# Patient Record
Sex: FEMALE | Race: WHITE | Employment: OTHER
[De-identification: names, ages, dates, MRNs, and addresses within clinical notes are randomized per-mention and may not be internally consistent; named-entity substitution may affect disease eponyms.]

---

## 2017-05-16 ENCOUNTER — HOSPITAL ENCOUNTER (OUTPATIENT)
Dept: PHYSICAL THERAPY | Facility: CLINIC | Age: 77
Setting detail: THERAPIES SERIES
Discharge: HOME | End: 2017-05-16
Payer: MEDICARE

## 2017-05-16 PROCEDURE — G0283 ELEC STIM OTHER THAN WOUND: HCPCS

## 2017-05-16 PROCEDURE — 97162 PT EVAL MOD COMPLEX 30 MIN: CPT

## 2017-05-16 PROCEDURE — G8979 MOBILITY GOAL STATUS: HCPCS

## 2017-05-16 PROCEDURE — G8978 MOBILITY CURRENT STATUS: HCPCS

## 2017-05-16 PROCEDURE — 97110 THERAPEUTIC EXERCISES: CPT

## 2017-05-23 ENCOUNTER — HOSPITAL ENCOUNTER (OUTPATIENT)
Dept: PHYSICAL THERAPY | Facility: CLINIC | Age: 77
Setting detail: THERAPIES SERIES
Discharge: HOME | End: 2017-05-23
Payer: MEDICARE

## 2017-05-23 PROCEDURE — 97110 THERAPEUTIC EXERCISES: CPT

## 2017-05-23 PROCEDURE — 97035 APP MDLTY 1+ULTRASOUND EA 15: CPT

## 2017-05-23 PROCEDURE — G0283 ELEC STIM OTHER THAN WOUND: HCPCS

## 2017-05-26 ENCOUNTER — HOSPITAL ENCOUNTER (OUTPATIENT)
Dept: PHYSICAL THERAPY | Facility: CLINIC | Age: 77
Setting detail: THERAPIES SERIES
Discharge: HOME | End: 2017-05-26
Payer: MEDICARE

## 2017-05-30 ENCOUNTER — HOSPITAL ENCOUNTER (OUTPATIENT)
Dept: PHYSICAL THERAPY | Facility: CLINIC | Age: 77
Setting detail: THERAPIES SERIES
Discharge: HOME | End: 2017-05-30
Payer: MEDICARE

## 2017-06-02 ENCOUNTER — HOSPITAL ENCOUNTER (OUTPATIENT)
Dept: PHYSICAL THERAPY | Facility: CLINIC | Age: 77
Setting detail: THERAPIES SERIES
Discharge: HOME | End: 2017-06-02
Payer: MEDICARE

## 2017-06-02 PROCEDURE — 97110 THERAPEUTIC EXERCISES: CPT

## 2017-06-02 PROCEDURE — 97035 APP MDLTY 1+ULTRASOUND EA 15: CPT

## 2017-06-02 PROCEDURE — G8979 MOBILITY GOAL STATUS: HCPCS

## 2017-06-02 PROCEDURE — G0283 ELEC STIM OTHER THAN WOUND: HCPCS

## 2017-06-02 PROCEDURE — G8980 MOBILITY D/C STATUS: HCPCS

## 2017-06-07 ENCOUNTER — APPOINTMENT (OUTPATIENT)
Dept: PHYSICAL THERAPY | Facility: CLINIC | Age: 77
End: 2017-06-07
Payer: MEDICARE

## 2017-06-26 ENCOUNTER — HOSPITAL ENCOUNTER (OUTPATIENT)
Dept: PHYSICAL THERAPY | Facility: CLINIC | Age: 77
Setting detail: THERAPIES SERIES
Discharge: HOME | End: 2017-06-26
Payer: MEDICARE

## 2017-06-26 PROCEDURE — 97110 THERAPEUTIC EXERCISES: CPT

## 2017-06-26 PROCEDURE — G8979 MOBILITY GOAL STATUS: HCPCS

## 2017-06-26 PROCEDURE — 97162 PT EVAL MOD COMPLEX 30 MIN: CPT

## 2017-06-26 PROCEDURE — G8978 MOBILITY CURRENT STATUS: HCPCS

## 2017-06-26 PROCEDURE — 97016 VASOPNEUMATIC DEVICE THERAPY: CPT

## 2017-06-28 ENCOUNTER — HOSPITAL ENCOUNTER (OUTPATIENT)
Dept: PHYSICAL THERAPY | Facility: CLINIC | Age: 77
Setting detail: THERAPIES SERIES
Discharge: HOME | End: 2017-06-28
Payer: MEDICARE

## 2017-06-28 PROCEDURE — 97016 VASOPNEUMATIC DEVICE THERAPY: CPT

## 2017-06-28 PROCEDURE — 97110 THERAPEUTIC EXERCISES: CPT

## 2017-06-30 ENCOUNTER — APPOINTMENT (OUTPATIENT)
Dept: PHYSICAL THERAPY | Facility: CLINIC | Age: 77
End: 2017-06-30
Payer: MEDICARE

## 2017-06-30 ENCOUNTER — HOSPITAL ENCOUNTER (OUTPATIENT)
Dept: PHYSICAL THERAPY | Facility: CLINIC | Age: 77
Setting detail: THERAPIES SERIES
Discharge: HOME | End: 2017-06-30
Payer: MEDICARE

## 2017-06-30 PROCEDURE — 97016 VASOPNEUMATIC DEVICE THERAPY: CPT

## 2017-06-30 PROCEDURE — 97110 THERAPEUTIC EXERCISES: CPT

## 2017-07-03 ENCOUNTER — HOSPITAL ENCOUNTER (OUTPATIENT)
Dept: PHYSICAL THERAPY | Facility: CLINIC | Age: 77
Setting detail: THERAPIES SERIES
Discharge: HOME | End: 2017-07-03
Payer: MEDICARE

## 2017-07-03 PROCEDURE — 97016 VASOPNEUMATIC DEVICE THERAPY: CPT

## 2017-07-03 PROCEDURE — 97110 THERAPEUTIC EXERCISES: CPT

## 2017-07-05 ENCOUNTER — HOSPITAL ENCOUNTER (OUTPATIENT)
Dept: PHYSICAL THERAPY | Facility: CLINIC | Age: 77
Setting detail: THERAPIES SERIES
Discharge: HOME | End: 2017-07-05
Payer: MEDICARE

## 2017-07-05 PROCEDURE — 97124 MASSAGE THERAPY: CPT

## 2017-07-05 PROCEDURE — 97110 THERAPEUTIC EXERCISES: CPT

## 2017-07-05 PROCEDURE — 97016 VASOPNEUMATIC DEVICE THERAPY: CPT

## 2017-07-07 ENCOUNTER — HOSPITAL ENCOUNTER (OUTPATIENT)
Dept: PHYSICAL THERAPY | Facility: CLINIC | Age: 77
Setting detail: THERAPIES SERIES
Discharge: HOME | End: 2017-07-07
Payer: MEDICARE

## 2017-07-07 PROCEDURE — 97016 VASOPNEUMATIC DEVICE THERAPY: CPT

## 2017-07-07 PROCEDURE — 97124 MASSAGE THERAPY: CPT

## 2017-07-07 PROCEDURE — 97110 THERAPEUTIC EXERCISES: CPT

## 2017-07-07 PROCEDURE — 97116 GAIT TRAINING THERAPY: CPT

## 2017-07-12 ENCOUNTER — HOSPITAL ENCOUNTER (OUTPATIENT)
Dept: PHYSICAL THERAPY | Facility: CLINIC | Age: 77
Setting detail: THERAPIES SERIES
Discharge: HOME | End: 2017-07-12
Payer: MEDICARE

## 2017-07-12 PROCEDURE — 97110 THERAPEUTIC EXERCISES: CPT

## 2017-07-12 PROCEDURE — 97116 GAIT TRAINING THERAPY: CPT

## 2017-07-12 PROCEDURE — 97124 MASSAGE THERAPY: CPT

## 2017-07-12 PROCEDURE — 97016 VASOPNEUMATIC DEVICE THERAPY: CPT

## 2017-07-14 ENCOUNTER — HOSPITAL ENCOUNTER (OUTPATIENT)
Dept: PHYSICAL THERAPY | Facility: CLINIC | Age: 77
Setting detail: THERAPIES SERIES
Discharge: HOME | End: 2017-07-14
Payer: MEDICARE

## 2017-07-14 PROCEDURE — 97016 VASOPNEUMATIC DEVICE THERAPY: CPT

## 2017-07-14 PROCEDURE — 97124 MASSAGE THERAPY: CPT

## 2017-07-14 PROCEDURE — 97116 GAIT TRAINING THERAPY: CPT

## 2017-07-14 PROCEDURE — 97110 THERAPEUTIC EXERCISES: CPT

## 2017-07-17 ENCOUNTER — HOSPITAL ENCOUNTER (OUTPATIENT)
Dept: PHYSICAL THERAPY | Facility: CLINIC | Age: 77
Setting detail: THERAPIES SERIES
Discharge: HOME | End: 2017-07-17
Payer: MEDICARE

## 2017-07-17 PROCEDURE — G8978 MOBILITY CURRENT STATUS: HCPCS

## 2017-07-17 PROCEDURE — G8979 MOBILITY GOAL STATUS: HCPCS

## 2017-07-17 PROCEDURE — 97116 GAIT TRAINING THERAPY: CPT

## 2017-07-17 PROCEDURE — 97016 VASOPNEUMATIC DEVICE THERAPY: CPT

## 2017-07-17 PROCEDURE — 97110 THERAPEUTIC EXERCISES: CPT

## 2017-07-19 ENCOUNTER — HOSPITAL ENCOUNTER (OUTPATIENT)
Dept: PHYSICAL THERAPY | Facility: CLINIC | Age: 77
Setting detail: THERAPIES SERIES
Discharge: HOME | End: 2017-07-19
Payer: MEDICARE

## 2017-07-19 PROCEDURE — 97116 GAIT TRAINING THERAPY: CPT

## 2017-07-19 PROCEDURE — 97016 VASOPNEUMATIC DEVICE THERAPY: CPT

## 2017-07-19 PROCEDURE — 97124 MASSAGE THERAPY: CPT

## 2017-07-19 PROCEDURE — 97110 THERAPEUTIC EXERCISES: CPT

## 2017-07-24 ENCOUNTER — HOSPITAL ENCOUNTER (OUTPATIENT)
Dept: PHYSICAL THERAPY | Facility: CLINIC | Age: 77
Setting detail: THERAPIES SERIES
Discharge: HOME | End: 2017-07-24
Payer: MEDICARE

## 2017-07-24 PROCEDURE — 97110 THERAPEUTIC EXERCISES: CPT

## 2017-07-24 PROCEDURE — 97016 VASOPNEUMATIC DEVICE THERAPY: CPT

## 2017-07-26 ENCOUNTER — HOSPITAL ENCOUNTER (OUTPATIENT)
Dept: PHYSICAL THERAPY | Facility: CLINIC | Age: 77
Setting detail: THERAPIES SERIES
Discharge: HOME | End: 2017-07-26
Payer: MEDICARE

## 2017-07-26 PROCEDURE — 97110 THERAPEUTIC EXERCISES: CPT

## 2017-07-26 PROCEDURE — 97016 VASOPNEUMATIC DEVICE THERAPY: CPT

## 2017-07-26 PROCEDURE — 97124 MASSAGE THERAPY: CPT

## 2017-07-28 ENCOUNTER — HOSPITAL ENCOUNTER (OUTPATIENT)
Dept: PHYSICAL THERAPY | Facility: CLINIC | Age: 77
Setting detail: THERAPIES SERIES
Discharge: HOME | End: 2017-07-28
Payer: MEDICARE

## 2017-07-28 PROCEDURE — 97016 VASOPNEUMATIC DEVICE THERAPY: CPT

## 2017-07-28 PROCEDURE — 97124 MASSAGE THERAPY: CPT

## 2017-07-28 PROCEDURE — 97110 THERAPEUTIC EXERCISES: CPT

## 2017-07-31 ENCOUNTER — HOSPITAL ENCOUNTER (OUTPATIENT)
Dept: PHYSICAL THERAPY | Facility: CLINIC | Age: 77
Setting detail: THERAPIES SERIES
Discharge: HOME | End: 2017-07-31
Payer: MEDICARE

## 2017-07-31 PROCEDURE — 97124 MASSAGE THERAPY: CPT

## 2017-07-31 PROCEDURE — 97016 VASOPNEUMATIC DEVICE THERAPY: CPT

## 2017-07-31 PROCEDURE — 97110 THERAPEUTIC EXERCISES: CPT

## 2017-08-02 ENCOUNTER — APPOINTMENT (OUTPATIENT)
Dept: PHYSICAL THERAPY | Facility: CLINIC | Age: 77
End: 2017-08-02
Payer: MEDICARE

## 2017-08-04 ENCOUNTER — HOSPITAL ENCOUNTER (OUTPATIENT)
Dept: PHYSICAL THERAPY | Facility: CLINIC | Age: 77
Setting detail: THERAPIES SERIES
Discharge: HOME | End: 2017-08-04
Payer: MEDICARE

## 2017-08-04 PROCEDURE — 97016 VASOPNEUMATIC DEVICE THERAPY: CPT

## 2017-08-04 PROCEDURE — 97110 THERAPEUTIC EXERCISES: CPT

## 2017-08-07 ENCOUNTER — HOSPITAL ENCOUNTER (OUTPATIENT)
Dept: PHYSICAL THERAPY | Facility: CLINIC | Age: 77
Setting detail: THERAPIES SERIES
Discharge: HOME | End: 2017-08-07
Payer: MEDICARE

## 2017-08-07 PROCEDURE — 97110 THERAPEUTIC EXERCISES: CPT

## 2017-08-07 PROCEDURE — 97016 VASOPNEUMATIC DEVICE THERAPY: CPT

## 2020-09-22 ENCOUNTER — HOSPITAL ENCOUNTER (OUTPATIENT)
Dept: PHYSICAL THERAPY | Facility: CLINIC | Age: 80
Setting detail: THERAPIES SERIES
Discharge: HOME | End: 2020-09-22
Payer: MEDICARE

## 2020-09-22 PROCEDURE — 97110 THERAPEUTIC EXERCISES: CPT

## 2020-09-22 PROCEDURE — 97124 MASSAGE THERAPY: CPT

## 2020-09-22 PROCEDURE — 97161 PT EVAL LOW COMPLEX 20 MIN: CPT

## 2020-09-22 NOTE — CONSULTS
[] [address]  [address]  [phone]  F: [phone] [] [address] [facility] 36   [address]  P: [phone]  F: [phone] [x] [address]  [address] [facility]  P: [phone]  F: [phone] [] [address]  [facility]   [address]   [place]: [phone]  F: [phone]      Physical Therapy General Evaluation    Date:  2020  Patient: Nalini Edwards  :   MRN: [id number]  Physician: Humphrey Hodgson      Insurance:Medicare   Medical Diagnosis: R TKA revision     Rehab Codes: Onset Date:                                   Next 's appt:     Subjective:   CC: Pain in knee, stiffness, fatigue  HPI: (onset date) Surgery @ [facility] for revision R TKA. Pt did her own HEP since 3rd TKA. PMHx: [] Unremarkable [] Diabetes [] HTN  [] Pacemaker   [] MI/Heart Problems [] Cancer [] Arthritis [x] Other:B TSA, back problems, L TKA                 [x] Refer to full medical chart  In EPIC     Comorbidities:   [] Obesity [] Dialysis  [] Other:   [] Asthma/COPD [] Dementia [] Other:   [] Stroke [] Sleep apnea [] Other:   [] Vascular disease [] Rheumatic disease [] Other:     Tests: [x] X-Ray: [] MRI:  [] Other:    Medications: [x] Refer to full medical record [] None [x] Other Aleve last 2 weeks :   Allergies:      [x] Refer to full medical record [] None [] Other:    Function:  Hand Dominance  [] Right  [x] Left  Patient lives with:     In what type of home [x]  One story   [] Two story   [] Split level   Number of stairs to enter    With handrail on the []  Right to enter   [] Left to enter   Bathroom has a []  Tub only  [] Tub/shower combo   [x] Walk in shower    []  Grab bars   Washing machine is on [x]  Main level   [] Second level   [] Basement   Employer    Job Status []  Normal duty   [] Light duty   [] Off due to condition    [x]  Retired   [] Not employed   [] Disability  [] Other:  []  Return to work:    Work activities/duties        ADL/IADL Previous level of function Current level of function Who currently assists the patient with task   Bathing  [] Independent  [] Assist [x] Independent  [] Assist    Dress/grooming [] Independent  [] Assist [x] Independent  [] Assist    Transfer/mobility [] Independent  [] Assist [x] Independent  [] Assist    Feeding [] Independent  [] Assist [x] Independent  [] Assist    Toileting [] Independent  [] Assist [x] Independent  [] Assist    Driving [] Independent  [] Assist [x] Independent  [] Assist    Housekeeping [] Independent  [] Assist [] Independent  [x] Assist anything low can not     Grocery shop/meal prep [] Independent  [] Assist [] Independent  [x] Assist w/ , smaller grocery stores        Gait Prior level of function Current level of function    [] Independent  [] Assist [x] Independent  [] Assist   Device: [] Independent [x] Independent    [] Straight Cane [] Quad cane [] Straight Cane [] Quad cane    [] Standard walker [] Rolling walker   [] 4 wheeled walker [] Standard walker [] Rolling walker   [] 4 wheeled walker    [] Wheelchair [] Wheelchair       Pt feels does better w/o cane   Pain:  [x] Yes  [] No Location: R knee  Pain Rating: (0-10 scale)3-4  /10  Pain altered Tx:  [] Yes  [] No  Action:    Symptoms:  [x] Improving slightly  [] Worsening [] Same  Better:  [x] AM    [] PM    [x] Sit    [] Rise/Sit    []Stand    [] Walk    [] Lying    [] Other:  Worse: [] AM    [x] PM    [] Sit    [] Rise/Sit    [x]Stand  Stiff & ache   [] Walk    [] Lying    [x] Bend                      [] Valsalva    [] Other:  Sleep: [x] OK    [] Disturbed    Objective: good patella mobility, moderate quad contraction      ROM  ° A/P STRENGTH  ROM    Left Right Left Right Cervical    Shoulder Flex Flexion    Abd     Extension    Elbow Flex     Rotation L R   Ext     Sidebend L R   Wrist Flex     Retraction    Ext     Lumbar    Hand      Flexion    Hip Flex  110   Extension    Ext     Rotation L  R   Abd  25   Sidebend L R   Knee Flex  112  4-      Ext  0  4      Hamstring  65        Ankle DF  15         PF  wnl           OBSERVATION No Deficit Deficit Not Tested Comments   Posture       Forward Head [] [] []    Rounded Shoulders [] [x] []    Kyphosis [] [] []    Lordosis [] [] []    Lateral Shift [] [] []    Scoliosis [] [] []    Iliac Crest [] [] []    PSIS [] [] []    ASIS [] [] []    Genu Valgus [] [x] []    Genu Varus [] [] []    Genu Recurvatum [] [] []    Pronation [] [x] []    Supination [] [] []    Leg Length Discrp [] [] []    Slumped Sitting [] [] []    Palpation [] [x] [] edl t e ad laeral to err patela. Sensation [] [x] [] Numbness B lower  LE    Edema [x] [] [] Mild peripatella in ankles PM    Neurological [x] [] []        Gait: pt walks w/ R LE in valgus but much better since R knee revison. Walked 200 feet a little tired not much  Functional Test: LE  32/80  40%  Score: 60 % functionally impaired     Comments:    Assessment:  Patient would benefit from skilled physical therapy services in order to:     Problems:    [x] ? Pain:  [x] ? ROM:  [x] ? Strength:  [x] ? Function:  [] Other:      STG: (to be met in 6 treatments)  1. ? Pain: < 3 w/ ADL @ ed  Day   2. ? ROM: 115  3. ? Strength:4+/5 Q/H   4. ? Function: +8 40/80 50% mpared    5. Patient to be independent with home exercise program as demonstrated by performance with correct form without cues. 6. Demonstrate Knowledge of fall prevention  7. Less c/o fatigue   LTG: (to be met in 12  treatments)  1. R 120 @ knee  2. 5-/5 q/h strength   3.  Function +15      Patient goals: less and less fatigue and  strength    Rehab Potential:  [x] Good  [] Fair  [] Poor   Suggested Professional Referral:  [x] No  [] Yes:  Barriers to Goal Achievement:  [x] No  [] Yes:  Domestic Concerns:  [x] No  [] Yes:    Pt. Education:  [x] Plans/Goals, Risks/Benefits discussed  [] Home exercise program  Method of Education: [x] Verbal  [x] Demo  [] Written  Comprehension of Education:  [x] Verbalizes understanding. [] Demonstrates understanding. [] Needs Review. [] Demonstrates/verbalizes understanding of HEP/Ed previously given. Treatment Plan:  [x] Therapeutic Exercise   11411  [] Iontophoresis: 4 mg/mL Dexamethasone Sodium Phosphate  mAmin  13431   [] Therapeutic Activity  10588 [x] Vasopneumatic cold with compression  43922    [x] Gait Training   26235 [x] Ultrasound   54104   [x] Neuromuscular Re-education  53279 [x] Electrical Stimulation Unattended  86101   [x] Manual Therapy  73545 [] Electrical Stimulation Attended  26056   [x] Instruction in HEP  [] Lumbar/Cervical Traction  85088   [x] Aquatic Therapy   96646 [x] Cold/hotpack    [] Massage   44602      [] Dry Needling, 1 or 2 muscles  78107   [] Biofeedback, first 15 minutes   05194  [] Biofeedback, additional 15 minutes   17058 [] Dry Needling, 3 or more muscles  77843     []  Medication allergies reviewed for use of  Dexamethasone Sodium Phosphate 4mg/ml     with iontophoresis treatments. Pt is not allergic.     Frequency:  2 x/week for  10visits    Todays Treatment:  Modalities:massage to R knee  Precautions: No weight machines    Exercises:  Exercise Reps/ Time Weight/ Level Comments   Nu step  10  L2    // bars         Retro walking  3       lateral walk 3       Hip abd         Hip flexion         Knee flexion         Heel raises                mat        hamstring stretch w/  rope    Easy     SAQ  15 3#    Other:    Specific Instructions for next treatment::advance program based  Orthopedic visit on 9/25    Evaluation Complexity:  History (Personal factors, comorbidities) [] 0 [x] 1-2 [] 3+   Exam (limitations, restrictions) [x] 1-2 [] 3 [] 4+   Clinical presentation (progression) [x]

## 2020-09-28 ENCOUNTER — HOSPITAL ENCOUNTER (OUTPATIENT)
Dept: PHYSICAL THERAPY | Facility: CLINIC | Age: 80
Setting detail: THERAPIES SERIES
Discharge: HOME | End: 2020-09-28
Payer: MEDICARE

## 2020-09-28 PROCEDURE — 97110 THERAPEUTIC EXERCISES: CPT

## 2020-09-28 PROCEDURE — 97124 MASSAGE THERAPY: CPT

## 2020-09-28 NOTE — FLOWSHEET NOTE
[] [facility]) - [facility]  [facility]  [address]  [phone]  F: [phone] [] [address]  [place] [address]   [address]  P: [phone]  F: [phone] [x] [address] &  Therapy  [address] [facility]  P: [phone]  F: [phone] [] [address]  [facility]   [address]   [place]: [phone]  F: [phone]      Physical Therapy Daily Treatment Note    Date:  2020  Patient Name:  Truman Denver    :  1940  MRN: [id number]  PPhysician: Tashia Rush                              Insurance:Medicare   Medical Diagnosis: R TKA revision                Rehab Codes: M25.561  Onset Date:                                   Next 's appt: None scheduled    Visit# / total visits:2/; Progress note for Medicare patient due at visit   Cancels/No Shows:     Subjective:    Pain:  [x] Yes  [] No Location:  R  Pain Rating: (0-10 scale) 2/10  Pain altered Tx:  [] No  [x] Yes  Action:careful w/ L hip   Comments: Pt has left hip pain w/ wt bearing after climbing a step on 20  . Objective:  Pt had appt w/ sugeon 20. DR. Vanessa Garza pleased w/ her progress, no furthe appt scheduled   Quad cotraction: fair contraction    Modalities: massage to R knee  Precautions:No weight machines per Dr Vanessa Garza   Exercises:  Exercise Reps/ Time Weight/ Level Comments   Nu step  10  L2     // bars            Retro walking  3  1 1/2       lateral walk 3  1 /2       Hip abd   15  1 1/2       Hip flexion   15  \"       Knee flexion   15  \"       Heel raises   15  \"       calf stretch           mat           hamstring stretch w/  rope      Easy           SAQ  15 3#           Sitting         LAQ  15 11/2       Hip add   10  Ball small      Other:    Treatment Charges: Mins Units   []  Modalities     [x]  Ther

## 2020-10-05 ENCOUNTER — HOSPITAL ENCOUNTER (OUTPATIENT)
Dept: PHYSICAL THERAPY | Facility: CLINIC | Age: 80
Setting detail: THERAPIES SERIES
Discharge: HOME | End: 2020-10-05
Payer: MEDICARE

## 2020-10-05 PROCEDURE — 97032 APPL MODALITY 1+ESTIM EA 15: CPT

## 2020-10-05 PROCEDURE — 97110 THERAPEUTIC EXERCISES: CPT

## 2020-10-05 NOTE — FLOWSHEET NOTE
[] [facility]) - [facility]  [facility]  [address]  [phone]  F: [phone] [] [address]  [place] [address]   [address]  P: [phone]  F: [phone] [x] [address] &  Therapy  [address] [facility]  P: [phone]  F: [phone] [] [address]  [facility]   [address]   [place]: [phone]  F: [phone]      Physical Therapy Daily Treatment Note    Date:  10/5/2020  Patient Name:  Penny Jaramillo    :  1940  MRN: [id number]  PPhysician: Higinio Lea                              Insurance:Medicare   Medical Diagnosis: R TKA revision                Rehab Codes: M25.561  Onset Date:                                   Next 's appt: None scheduled    Visit# / total visits:3/; Progress note for Medicare patient due at visit   Cancels/No Shows: 0    Subjective:    Pain:  [x] Yes  [] No Location:  R knee Pain Rating: (0-10 scale) 1-2/10  Pain altered Tx:  [] No  [x] Yes  Action:careful w/ L hip   Comments:no pain in hip and shoulder    Objective:  Pt had appt w/ sugeon 20. DR. Claudette Trujillo pleased w/ her progress, no furthe appt scheduled   Quad cotraction: fair contraction    Modalities: massage to R knee  Precautions:No weight machines per Dr Claudette Trujillo   Exercises:  Exercise Reps/ Time Weight/ Level Comments today   Nu step  10  L2   x   // bars             Retro walking  L3  1 1/2   x     lateral walk 3  1 /2   x     Hip abd   15  1 1/2        Hip flexion   153L  \" marches x     Knee flexion   15 3   x     Heel raises   15  \"   x     calf stretch   3x2   Gastro& soleus x    squats 15   x   Step downs next       step ups  lateral 15 4  x   Terminal knee extension 15 peach  x    mat            hamstring stretch w/  rope      Easy                    SAQ  15 3#   x    hip add 15 pillow  x   Hip abd 15 [place] T  x   Hip abd 15  sideline x   Sitting          LAQ  15 11/2   x                          Other:    Treatment Charges: Mins Units   []  Modalities     [x]  Ther Exercise 50 3   []  Manual Therapy     []  Ther Activities     []  Aquatics     []  Vasocompression     []  Other massage      Total Treatment time     10:35-11;45  Assessment: [x] Progressing toward goals. addedd many exercises to program. No hip pain today, min R knee pain. Pt has shoulder pain in her TSA. [] No change. [] Other:Progress pt slowly today due to painful L hip. [] Patient would continue to benefit from skilled physical therapy services in order to: aceive goals    STG: (to be met in 6 treatments)  1. ? Pain: < 3 w/ ADL @ ed  Day   2. ? ROM: 115  3. ? Strength:4+/5 Q/H   4. ? Function: +8 40/80 50% impaired    5. Patient to be independent with home exercise program as demonstrated by performance with correct form without cues. 6. Demonstrate Knowledge of fall prevention  7. Less c/o fatigue   LTG: (to be met in 12  treatments)  1. R 120 @ knee  2. 5-/5 q/h strength   3. Function +15       Patient goals: less and less fatigue and  strength  Pt. Education:  [x] Yes  [] No  [] Reviewed Prior HEP/Ed  Method of Education: [x] Verbal  [x] Demo  [x] Written  Comprehension of Education:  [x] Verbalizes understanding. [x] Demonstrates understanding. [] Needs review. [] Demonstrates/verbalizes HEP/Ed previously given. Plan: [x] Continue current frequency toward long and short term goals.     [] Specific Instructions for subsequent treatments: Precautions:No weight machines per Dr Mary Fontanez     Time In:10:35          Time Out: 11:45    Electronically signed by:  Rick Huynh, PT

## 2020-10-08 ENCOUNTER — HOSPITAL ENCOUNTER (OUTPATIENT)
Dept: PHYSICAL THERAPY | Facility: CLINIC | Age: 80
Setting detail: THERAPIES SERIES
Discharge: HOME | End: 2020-10-08
Payer: MEDICARE

## 2020-10-08 PROCEDURE — 97110 THERAPEUTIC EXERCISES: CPT

## 2020-10-08 NOTE — FLOWSHEET NOTE
[] [facility]) - [facility] &  Therapy  [address]  [phone]  F: [phone] [] [address]  [place] [address]   [address]  P: [phone]  F: [phone] [x] [address] &  Therapy  [address] [facility]  P: [phone]  F: [phone] [] [address]  [facility]   [address]   [place]: [phone]  F: [phone]      Physical Therapy Daily Treatment Note    Date:  10/8/2020  Patient Name:  Scar Felder    :  [birth year]  MRN: [id number]  PPhysician: Richard Kaur                              Insurance:Medicare   Medical Diagnosis: R TKA revision                Rehab Codes: M25.561  Onset Date:                                   Next 's appt: None scheduled    Visit# / total visits 4/; Progress note for Medicare patient due at visit   Cancels/No Shows: 0    Subjective:    Pain:  [x] Yes  [] No Location:  R knee Pain Rating: (0-10 scale) 1-2/10  Pain altered Tx:  [] No  [x] Yes  Action:careful w/ L hip   Comments: Back hurt yesterday. R shoulder sore but Left Handed. Objective:  Pt had appt w/ sugeon 20. Dr. Molina Harrell pleased w/ her progress, no further appt scheduled   Quad cotraction: Good  contraction Gait: no increase in pain w [place].  R leg in valgus    Modalities: massage to R knee  Precautions:No weight machines per Dr Molina Harrell   Exercises:  Exercise Reps/ Time Weight/ Level Comments today   Nu step  10  L2   x   // bars             Retro walking  L3  1 1/2   x     lateral walk 3  1 /2   x     Hip abd   15  1 1/2        Hip flexion   15 2/12 marches x     Knee flexion   20 3   x     Heel raises   20 2 1/2   x     calf stretch   3 x 2   Gastro & soleus x    squats 15   x    Step downs 15 2      step ups  lateral 15 4  x    step ups 15  4  x   Terminal knee extension 15 peach x           mat            hamstring stretch w/  rope      Easy                    SAQ  15 3#   x    hip add 15 pillow  x   Hip abd 15 [place] T  x   Hip abd 15  sideline x     25        LAQ  25 4#   x    bridges 20   x   Calm shells 10   x            Other:    Treatment Charges: Mins Units   []  Modalities     [x]  Ther Exercise 58 4   []  Manual Therapy     []  Ther Activities     []  Aquatics     []  Vasocompression     []  Other massage      Total Treatment time 58      Assessment: [x] Progressing toward goals. Again addedd many exercises to program. Good quad contraction. No hip pain today, min R knee pain. Pt has shoulder pain in her TSA. [] No change. [] Other:Progress pt slowly today due to painful L hip. [] Patient would continue to benefit from skilled physical therapy services in order to: aceive goals    STG: (to be met in 6 treatments)  1. ? Pain: < 3 w/ ADL @ ed  Day   2. ? ROM: 115  3. ? Strength:4+/5 Q/H   4. ? Function: +8 40/80 50% impaired    5. Patient to be independent with home exercise program as demonstrated by performance with correct form without cues. 6. Demonstrate Knowledge of fall prevention  7. Less c/o fatigue   LTG: (to be met in 12  treatments)  1. R 120 @ knee  2. 5-/5 q/h strength   3. Function +15       Patient goals: less and less fatigue and  strength  Pt. Education:  [x] Yes  [] No  [] Reviewed Prior HEP/Ed  Method of Education: [x] Verbal  [x] Demo  [x] Written  Comprehension of Education:  [x] Verbalizes understanding. [x] Demonstrates understanding. [] Needs review. [] Demonstrates/verbalizes HEP/Ed previously given. Plan: [x] Continue current frequency toward long and short term goals.     [] Specific Instructions for subsequent treatments: Precautions:No weight machines per Dr Makayla Jaramillo     Time In:10:30          Time Out: 11:32    Electronically signed by:  Claude Baars, PT

## 2020-10-12 ENCOUNTER — HOSPITAL ENCOUNTER (OUTPATIENT)
Dept: PHYSICAL THERAPY | Facility: CLINIC | Age: 80
Setting detail: THERAPIES SERIES
Discharge: HOME | End: 2020-10-12
Payer: MEDICARE

## 2020-10-12 PROCEDURE — 97110 THERAPEUTIC EXERCISES: CPT

## 2020-10-12 NOTE — FLOWSHEET NOTE
[] [facility]) - [facility]  [address]  [phone]  F: [phone] [] [address]  [facility]   [address]  P: [phone]  F: [phone] [x] [address] &  Therapy  [address] [facility]  P: [phone]  F: [phone] [] [address]  [facility]   [address]   [place]: [phone]  F: [phone]      Physical Therapy Daily Treatment Note    Date:  10/12/2020  Patient Name:  Jarrell Chacko    :  [birth year]  MRN: [id number]  PPhysician: Sandra Herrera                              Insurance:Medicare   Medical Diagnosis: R TKA revision                Rehab Codes: M25.561  Onset Date:                                   Next 's appt: None scheduled    Visit# / total visits 5/; Progress note for Medicare patient due at visit   Cancels/No Shows: 0    Subjective:    Pain:  [x] Yes  [] No Location:  R knee Pain Rating: (0-10 scale) 1-2/10  Pain altered Tx:  [] No  [x] Yes  Action:careful w/ L hip   Comments: Back Ok feels if moves wrong way go out, also stretches. R shoulder better Left Handed. Objective:  Pt had appt w/ sugeon 20. Dr. Iona Prado pleased w/ her progress, no further appt scheduled   10/8 Quad cotraction: Good  contraction Gait: no increase in pain w Harpal Caballero.  R leg in valgus    Modalities: massage to R knee  Precautions:No weight machines per Dr Iona Prado   Exercises:  Exercise Reps/ Time Weight/ Level Comments today   Nu step  10  L2   x   // bars             Retro walking  L3  1 1/2  no wts today x     lateral walk 3  1 /2  \" x     Hip abd   15  1 1/2  \"      Hip flexion   15  march x     Knee flexion   20 4   x     Heel raises   20 4   x     calf stretch   3 x 2   Gastro & soleus x    min squats 15   x    mini lounge 15   x    Step downs 15 4  x    step ups  lateral 15 4  x    step ups 15  4  x    Terminal knee extension 15 blue  x     Single leg stance B 10  W/deep breathing x    sh ext to neutral 10 orange  x    mat            hamstring stretch w/  rope   3   Easy  x                  SAQ  15 4#   x    hip add 15 pillow  x   Hip abd 15 [place] T     Hip abd 10  sideline x            LAQ  20 4#   x    bridges 10   x   Calm shells 10   x            Other:    Treatment Charges: Mins Units   []  Modalities     [x]  Ther Exercise 60 4   []  Manual Therapy     []  Ther Activities     []  Aquatics     []  Vasocompression     []  Other massage      Total Treatment time 60      Assessment: [x] Progressing toward goals. Again addedd many exercises to program to work on core. Good quad contraction. [] No change. [] Other:.   [] Patient would continue to benefit from skilled physical therapy services in order to: aceive goals    STG: (to be met in 6 treatments)  1. ? Pain: < 3 w/ ADL @ ed  Day   2. ? ROM: 115  3. ? Strength:4+/5 Q/H   4. ? Function: +8 40/80 50% impaired    5. Patient to be independent with home exercise program as demonstrated by performance with correct form without cues. 6. Demonstrate Knowledge of fall prevention  7. Less c/o fatigue   LTG: (to be met in 12  treatments)  1. R 120 @ knee  2. 5-/5 q/h strength   3. Function +15       Patient goals: less and less fatigue and  strength  Pt. Education:  [x] Yes  [] No  [] Reviewed Prior HEP/Ed  Method of Education: [x] Verbal  [x] Demo  [x] Written  Comprehension of Education:  [x] Verbalizes understanding. [x] Demonstrates understanding. [] Needs review. [] Demonstrates/verbalizes HEP/Ed previously given. Plan: [x] Continue current frequency toward long and short term goals.     [] Specific Instructions for subsequent treatments: Precautions:No weight machines per Dr Claudette Trujillo     Time In:9:30         Time Out: 10:400  Electronically signed by:  Svetlana Driscoll PT

## 2020-10-16 ENCOUNTER — HOSPITAL ENCOUNTER (OUTPATIENT)
Dept: PHYSICAL THERAPY | Facility: CLINIC | Age: 80
Setting detail: THERAPIES SERIES
Discharge: HOME | End: 2020-10-16
Payer: MEDICARE

## 2020-10-16 PROCEDURE — 97530 THERAPEUTIC ACTIVITIES: CPT

## 2020-10-16 NOTE — FLOWSHEET NOTE
[] [facility]) [facility]  [facility]  [address]  [phone]  F: [phone] [] [address]  [facility]   [address]  P: [phone]  F: [phone] [x] [address] &  Therapy  [address]  P: [phone]  F: [phone] [] [address]  [facility]   [address]   [place]: [phone]  F: [phone]      Physical Therapy Daily Treatment Note    Date:  10/16/2020  Patient Name:  Cherlynn Severance    :  [birth year]  MRN: [id number]  PPhysician: Lety Dye                              Insurance:Medicare   Medical Diagnosis: R TKA revision                Rehab Codes: M25.561  Onset Date:                                   Next 's appt: None scheduled    Visit# / total visits 6/; Progress note for Medicare patient due at visit   Cancels/No Shows: 0    Subjective:    Pain:  [x] Yes  [] No Location:  R knee Pain Rating: (0-10 scale) 110  Pain altered Tx:  [] No  [x] Yes  Action:careful w/ L hip   Comments:Fatigue after last session. Knee feels ok getting stronger, pain decreasing. Iftikhar Nunez feels weaker than Cr Casper feels in car  A lot lately stiffness getting out of car. R shoulder ok. Objective:  Pt had appt w/ sugdeliaon 20. Dr. Lio Beard pleased w/ her progress, no further appt scheduled   10/16 Quad cotraction: Good  contraction Gait: no increase in pain w Jaquelinemahesh Monroy. R leg in valgus. ROM 0-115. Discomfort w/ palpation medial knee.    Modalities: massage to R knee not today  Precautions:No weight machines per Dr Lio Beard   Exercises:  Exercise Reps/ Time Weight/ Level Comments today   Nu step  10  L2   x   // bars             Retro walking  L3  1 1/2  no wts today x     lateral walk 3  1 /2  \" x     Hip abd   15  1 1/2  \"      Hip flexion   15  march x     Knee flexion   20 4   x     Heel raises   20 4   x     calf stretch   3 x 2   Gastro & soleus x    min squats 15   x    mini lounge 15   x    Step downs 15 4  x    step ups  lateral 15 6  x    step ups 15  6  x    Terminal knee extension 15 blue  x     Single leg stance B 10  W/deep breathing x     sh ext to neutral 10 orange  x    mat            hamstring stretch w/  rope   3   Easy  x                  SAQ  15 4#   x    hip add 15 pillow  x   Hip abd 15 blue T 75%    Hip abd 10  sideline x            LAQ  20 4#   x    bridges 10   x    Calm shells 10   x   Bridge w/ clam shell  5 blue  x     Other:    Treatment Charges: Mins Units   []  Modalities     [x]  Ther Exercise 60 4   []  Manual Therapy     []  Ther Activities     []  Aquatics     []  Vasocompression     []  Other massage      Total Treatment time 60 4     Assessment: [x] Progressing toward goals. Again addedd many exercises to program to work on core. Good quad contraction. ROM 0-115     [] No change. [] Other:.   [] Patient would continue to benefit from skilled physical therapy services in order to: aceive goals    STG: (to be met in 6 treatments)  1. ? Pain: < 3 w/ ADL @ ed  Day   2. ? ROM: 115  3. ? Strength:4+/5 Q/H   4. ? Function: +8 40/80 50% impaired    5. Patient to be independent with home exercise program as demonstrated by performance with correct form without cues. 6. Demonstrate Knowledge of fall prevention  7. Less c/o fatigue   LTG: (to be met in 12  treatments)  1. R 120 @ knee  2. 5-/5 q/h strength   3. Function +15       Patient goals: less and less fatigue and  strength  Pt. Education:  [x] Yes  [] No  [] Reviewed Prior HEP/Ed  Method of Education: [x] Verbal  [x] Demo  [x] Written  Comprehension of Education:  [x] Verbalizes understanding. [x] Demonstrates understanding. [] Needs review. [] Demonstrates/verbalizes HEP/Ed previously given. Plan: [x] Continue current frequency toward long and short term goals.     [] Specific Instructions for

## 2020-10-20 ENCOUNTER — HOSPITAL ENCOUNTER (OUTPATIENT)
Dept: PHYSICAL THERAPY | Facility: CLINIC | Age: 80
Setting detail: THERAPIES SERIES
Discharge: HOME | End: 2020-10-20
Payer: MEDICARE

## 2020-10-20 PROCEDURE — 97124 MASSAGE THERAPY: CPT

## 2020-10-20 PROCEDURE — 97110 THERAPEUTIC EXERCISES: CPT

## 2020-10-20 NOTE — FLOWSHEET NOTE
[] [facility]) [facility]  [facility]  [address]  [phone]  F: [phone] [] [address]  [facility]   [address]  P: [phone]  F: [phone] [x] [address] [place] [address] &  Therapy  [address]  P: [phone]  F: [phone] [] [address]  [facility]   [address]   [place]: [phone]  F: [phone]      Physical Therapy Daily Treatment Note    Date:  10/20/2020  Patient Name:  Moi Mccracken    :  [birth year]  MRN: [id number]  PPhysician: Macarena Betancur                              Insurance:Medicare   Medical Diagnosis: R TKA revision                Rehab Codes: M25.561  Onset Date:                                   Next 's appt: None scheduled    Visit# / total visits 7/; Progress note for Medicare patient due at visit   Cancels/No Shows: 0    Subjective:    Pain:  [x] Yes  [] No Location:  R knee Pain Rating: (0-10 scale) 1/10  Pain altered Tx:  [] No  [x] Yes  Action:careful w/ L hip   Comments:tired after last session. Knee feels ok getting stronger, pain decreasing. L knee feels weaker than Lalita Preet feels in car. A lot lately stiffness getting out of car. R shoulder ok. Objective:  Pt had appt w/ sugeon 20. Dr. Gonzalo Espino pleased w/ her progress, no further appt scheduled   10/20 Quad cotraction: Good  contraction Gait: no increase in pain w Lugoff Shearing. R leg in valgus. ROM 0-117. Discomfort w/ palpation medial knee.    Modalities: massage to R knee and distal quad today  Precautions:No weight machines per Dr Gonzalo Espino   Exercises:  Exercise Reps/ Time Weight/ Level Comments today   Nu step  10  L2   x   // bars             Retro walking  L3  1 1/2  no wts today x     lateral walk w/ mini squats 3  1 /2  \" x     Hip abd   15  1 1/2  \" x     Hip ext  15   x     Hip flexion   15 2/12 marches x     Knee flexion   20 4   x     Heel raises   20 4   x     calf stretch   3 x 2   Gastro & soleus x    min squats 15   x    mini lounge 15   x    Step downs 15 4  x    step ups  lateral 15 6  x    step ups 15  6  x    Terminal knee extension 15 blue       Single leg stance B 10  W/deep breathing x    quad stretch sideline 5 x30  Post massage      sh ext to neutral 10 orange  x    mat            hamstring stretch w/  rope   3   Easy  x                  SAQ  15 4#   x    hip add 15 pillow  x   Hip abd 15 blue T 75%    Hip abd 10  sideline x            LAQ  20 4#   x    bridges 10   x    Calm shells 10   x   Bridge w/ clam shell  5 blue  x     Other:    Treatment Charges: Mins Units   []  Modalities     [x]  Ther Exercise 43 4   []  Manual Therapy     []  Ther Activities     []  Aquatics     []  Vasocompression     []  Other massage  15 1   Total Treatment time 58 4     Assessment: [x] Progressing toward goals. Progress program .Good quad contraction. ROM 0-117     [] No change. [] Other:.   [] Patient would continue to benefit from skilled physical therapy services in order to: aceive goals    STG: (to be met in 6 treatments)  1. ? Pain: < 3 w/ ADL @ ed  Day   2. ? ROM: 115  3. ? Strength:4+/5 Q/H   4. ? Function: +8 40/80 50% impaired    5. Patient to be independent with home exercise program as demonstrated by performance with correct form without cues. 6. Demonstrate Knowledge of fall prevention  7. Less c/o fatigue   LTG: (to be met in 12  treatments)  1. R 120 @ knee  2. 5-/5 q/h strength   3. Function +15       Patient goals: less and less fatigue and  strength  Pt. Education:  [x] Yes  [] No  [] Reviewed Prior HEP/Ed  Method of Education: [x] Verbal  [x] Demo  [x] Written  Comprehension of Education:  [x] Verbalizes understanding. [x] Demonstrates understanding. [] Needs review. [] Demonstrates/verbalizes HEP/Ed previously given.      Plan: [x] Continue current frequency toward long and short term goals.     [] Specific Instructions for subsequent treatments: Precautions:No weight machines per Dr Paula Walter     Time In:9:03         Time Out: 10:04  Electronically signed by:  Ivelisse Melara PT

## 2020-10-23 ENCOUNTER — HOSPITAL ENCOUNTER (OUTPATIENT)
Dept: PHYSICAL THERAPY | Facility: CLINIC | Age: 80
Setting detail: THERAPIES SERIES
Discharge: HOME | End: 2020-10-23
Payer: MEDICARE

## 2020-10-23 PROCEDURE — 97110 THERAPEUTIC EXERCISES: CPT

## 2020-10-23 NOTE — FLOWSHEET NOTE
soleus x     min squats 15   x     mini lounge 15   x     Step downs 15 4  x     step ups  lateral 15 6  x     step ups 15  6  x     Terminal knee    extension 15 blue  x     Single leg stance B 10 ea  W/deep breathing,  Chest open  x      quad stretch sideline 5 x30  Post massage      sh ext to neutral 10 orange  x     mat            hamstring stretch w/  rope   3   Easy  x                  SAQ  15 4#   x    hip add 15 pillow  x   Hip abd 15 blue T 75%    Hip abd 10  sideline x     10       LAQ  20 5#   x    bridges 10   x    Calm shells 10   x    Bridge w/ clam shell  5 blue  x     Other:    Treatment Charges: Mins Units   []  Modalities     [x]  Ther Exercise 48 3   []  Manual Therapy     []  Ther Activities     []  Aquatics     []  Vasocompression     []  Other massage      Total Treatment time 48 3     Assessment: [x] Progressing toward goals. Progress program.Increased wts to 5# Good quad contraction. ROM 0-117 . pt completed LE44 functional scale. Pt has met most of STGs    [] No change. [] Other:.   [] Patient would continue to benefit from skilled physical therapy services in order to: acheive goals    STG: (to be met in 6 treatments)  1. ? Pain: < 3 w/ ADL @ end of Day MET   2. ? ROM: 115 Met   3. ? Strength:4+/5 Q/H   4. ? Function: +8 40/80 50% impaired  MET  5. Patient to be independent with home exercise program as demonstrated by performance with correct form without cues. Met  6. Demonstrate Knowledge of fall prevention Met  7. Less c/o fatigue met  LTG: (to be met in 12  treatments)  1. R 120 @ knee  2. 5-/5 q/h strength   3. Function +15       Patient goals: less and less fatigue and  strength  Pt. Education:  [x] Yes  [] No  [] Reviewed Prior HEP/Ed  Method of Education: [x] Verbal  [x] Demo  [x] Written  Comprehension of Education:  [x] Verbalizes understanding. [x] Demonstrates understanding. [] Needs review. [] Demonstrates/verbalizes HEP/Ed previously given.      Plan: [x] Continue current frequency toward long and short term goals.     [] Specific Instructions for subsequent treatments: Precautions:No weight machines per Dr Shabbir Monzon     Time In:9:03         Time Out: 10:04  Electronically signed by:  Jay Gray PT

## 2020-10-28 ENCOUNTER — HOSPITAL ENCOUNTER (OUTPATIENT)
Dept: PHYSICAL THERAPY | Facility: CLINIC | Age: 80
Setting detail: THERAPIES SERIES
Discharge: HOME | End: 2020-10-28
Payer: MEDICARE

## 2020-10-28 PROCEDURE — 97110 THERAPEUTIC EXERCISES: CPT

## 2020-10-28 NOTE — FLOWSHEET NOTE
[] [facility]) [facility]  [facility]  [address]  [phone]  F: [phone] [] [address]  [facility]   [address]  P: [phone]  F: [phone] [x] [address] &  Therapy  [address] [facility]  P: [phone]  F: [phone] [] [address]  [facility]   [address]   [place]: [phone]  F: [phone]      Physical Therapy Daily Treatment Note    Date:  10/28/2020  Patient Name:  Kael Gutierrez    :  1940  MRN: [id number]  PPhysician: Zehra Chicas                              Insurance:Medicare   Medical Diagnosis: R TKA revision                Rehab Codes: M25.561  Onset Date:                                   Next 's appt: None scheduled    Visit# / total visits 9/; Progress note for Medicare patient due at visit 10  Cancels/No Shows: 0    Subjective:    Pain:  [x] Yes  [] No Location:  R knee Pain Rating: (0-10 scale) 1/10  Pain altered Tx:  [] No  [x] Yes  Action:careful w/ L hip   Comments Last session 5 pounds too much for knee, so  to 4#s today  Objective:  10/23 3 months s/p TKA, LE 44  Pt had appt w/ sugeon 20. Dr. Blair Mg pleased w/ her progress, no further appt scheduled   10/20 Quad cotraction: Good contraction Gait: no increase in pain w Dean Sandy. R leg in valgus. ROM 0-117. Discomfort w/ palpation medial knee.    Modalities: massage to R knee and distal quad   Precautions:No weight machines per Dr Blair Mg   Exercises:  Exercise Reps/ Time Weight/ Level Comments today   Nu step  10  L2      // bars             Retro walking  L3   no wts today x     lateral walk w/ mini   squats 3    \" x     Hip abd   15    \" x     Hip ext  15   x     Hip flexion   15  marches x     Knee flexion   20 4   x     Heel raises   20 4   x     calf stretch   3 x 2   Gastro & soleus x     min squats 15  W/ lateral walk  x     mini lounge 15   x     Step downs 15 6  x     step ups  lateral 15 6  x     step ups 15  6  x     Terminal knee    extension 15 blue  x     Single leg stance B 10 ea  W/deep breathing,  Chest open  x      quad stretch sideline 5 x30  Post massage       sh ext to neutral 10 orange  x      3 way hip B  10 Lime  No adduction  Added 10/28      mat             hamstring stretch w/      rope   3   Easy  x                  SAQ  15 4#   x    hip add 15 pillow  x   Hip abd 15 blue T 75%    Hip abd 10  sideline x     10       LAQ  20 4#   x    bridges 10   x    Calm shells 10   x    Bridge w/ clam shell  5 blue  x     Other:    Treatment Charges: Mins Units   []  Modalities     [x]  Ther Exercise 55 4   []  Manual Therapy     []  Ther Activities     []  Aquatics     []  Vasocompression     []  Other massage      Total Treatment time 55 4     Assessment: [x] Progressing toward goals. Decreased wt to 4# as 5 too heavy for patient as knee bothered her post PT last week. Added 3 way hip exercises today. Pt completed all exercises on PRE sheet except Nu step bike as pt had exercise for an hour. Will see pt in a week and determine if need to ask for more PT visits. .      10/23 Progress program.Increased wts to 5# Good quad contraction . ROM 0-117 . pt completed LE 44 functional scale. Pt has met most of STGs    [] No change. [] Other:.   [x] Patient would continue to benefit from skilled physical therapy services in order to: acheive LT goals    STG: (to be met in 6 treatments)  1. ? Pain: < 3 w/ ADL @ end of Day MET   2. ? ROM: 115 Met   3. ? Strength:4+/5 Q/H   4. ? Function: +8 40/80 50% impaired  MET  5. Patient to be independent with home exercise program as demonstrated by performance with correct form without cues. Met  6. Demonstrate Knowledge of fall prevention Met  7. Less c/o fatigue met  LTG: (to be met in 12  treatments)  1.  R 120 @ knee  2. 5-/5 q/h strength 3. Function +15       Patient goals: less and less fatigue and  strength  Pt. Education:  [x] Yes  [] No  [] Reviewed Prior HEP/Ed  Method of Education: [x] Verbal  [x] Demo  [x] Written  Comprehension of Education:  [x] Verbalizes understanding. [x] Demonstrates understanding. [] Needs review. [] Demonstrates/verbalizes HEP/Ed previously given. Plan: [x] Continue current frequency toward long and short term goals.     [] Specific Instructions for subsequent treatments: Precautions:No weight machines per Dr Paula Walter     Time In:9:30         Time Out: 10:35  Electronically signed by:  Ivelisse Melara, PT

## 2020-11-06 ENCOUNTER — HOSPITAL ENCOUNTER (OUTPATIENT)
Dept: PHYSICAL THERAPY | Facility: CLINIC | Age: 80
Setting detail: THERAPIES SERIES
Discharge: HOME | End: 2020-11-06
Payer: MEDICARE

## 2020-11-06 PROCEDURE — 97530 THERAPEUTIC ACTIVITIES: CPT

## 2020-11-06 NOTE — FLOWSHEET NOTE
downs 15 6  x     step ups  lateral 15 6  x     step ups 15  6  x     Terminal knee    extension 15 blue       Single leg stance B 10 ea Green foam  W/deep breathing,  Chest open  Added foam 11/6  x      quad stretch sideline 5 x30   x      sh ext to neutral 10 orange  x      3 way hip B  10 Lime  No adduction  Added 10/28      heel to toe 2'  Added 11/6      Heel walking   \"   \"             mat             hamstring stretch w/      rope   3   Easy  x                  SAQ  15 4#   x    hip add 15 pillow  x   Hip abd 15 blue T 75%    Hip abd 10  sideline x     10       LAQ  20 4#   x    bridges 10   x    Calm shells 10   x    Bridge w/ clam shell  5 blue  x          Alt UE & LE 3'  Added 11/6             Other:    Treatment Charges: Mins Units   []  Modalities     [x]  Ther Exercise 55 4   []  Manual Therapy     []  Ther Activities     []  Aquatics     []  Vasocompression     []  Other massage      Total Treatment time 55 4     Assessment: [x] Progressing toward goals. . Pt has met all STGs. PT and pt feel she could benefit for more PT sessions in clinic to use equipment and PT's expertise to cont to strengthen LE and work on [address] Pt has had only 10 PT sessions s/p TKA    [] No change. [] Other:.   [x] Patient would continue to benefit from skilled physical therapy services in order to: acheive LT goals    STG: (to be met in 6 treatments)  1. ? Pain: < 3 w/ ADL @ end of Day MET   2. ? ROM: 115 Met   3. ? Strength:4+/5 Q/H met  4. ? Function: +8 40/80 50% impaired  MET  5. Patient to be independent with home exercise program as demonstrated by performance with correct form without cues. Met  6. Demonstrate Knowledge of fall prevention Met  7. Less c/o fatigue met  LTG: (to be met in 12  treatments)  1. R 120 @ knee  2. 5-/5 q/h strength   3. Function +15       Patient goals: less and less fatigue and  strength  Pt.  Education:  [x] Yes  [] No  [] Reviewed Prior HEP/Ed  Method of Education: [x] Verbal  [x] Demo  [] Written  Comprehension of Education:  [x] Verbalizes understanding. [x] Demonstrates understanding. [] Needs review. [] Demonstrates/verbalizes HEP/Ed previously given. Plan: [x] Continue current frequency toward long term goals.     [] Specific Instructions for subsequent treatments: Precautions:No weight machines per Dr Julien Olivares     Time In:9:31         Time Out: 10:35  Electronically signed by:  Caren Luong PT

## 2020-11-13 ENCOUNTER — HOSPITAL ENCOUNTER (OUTPATIENT)
Dept: PHYSICAL THERAPY | Facility: CLINIC | Age: 80
Setting detail: THERAPIES SERIES
Discharge: HOME | End: 2020-11-13
Payer: MEDICARE

## 2020-11-13 PROCEDURE — 97110 THERAPEUTIC EXERCISES: CPT

## 2020-11-13 NOTE — FLOWSHEET NOTE
Knee flexion   20 4   x     Heel raises   20 4   x     calf stretch   3 x 2   Gastro & soleus x     min squats 15  W/ lateral walk       mini lounge 15  @ 11, 12, 1 added 11/6 x     Step downs 15 6       step ups  Lateral B  15 6  x     step ups 15  6       Terminal knee    extension 15 blue       Single leg stance B 10 ea Green foam  W/deep breathing,  Chest open  Added foam 11/6  x      quad stretch sideline 5 x30         sh ext to neutral 10 orange  x      3 way hip B  10 Lime  No adduction  Added 10/28      heel to toe 2'  Added 11/6      Heel walking   \"   \"             mat             hamstring stretch w/      rope   3   Easy                    SAQ  15 4#   x    hip add 15 pillow     Hip abd 15 blue T 75%    Hip abd 10  sideline x     10       LAQ  B  20 4#   x    bridges 10   x    Calm shells B  10       Bridge w/ clam shell  5 blue            Alt UE & LE sitting 3'  Added 11/6 supine  11/13 x            Other:    Treatment Charges: Mins Units   []  Modalities     [x]  Ther Exercise 52 3   []  Manual Therapy     []  Ther Activities     []  Aquatics     []  Vasocompression     []  Other massage      Total Treatment time 60      Assessment: [x] Progressing toward goals. Min pain in R TKA, discomfort in L STEFANY posterior. Received new script to cont PT 11/11/20 will cont PT 1-2 x week      [] No change. [] Other:.   [x] Patient would continue to benefit from skilled physical therapy services in order to: acheive LT goals    STG: (to be met in 6 treatments)  1. ? Pain: < 3 w/ ADL @ end of Day MET   2. ? ROM: 115 Met   3. ? Strength:4+/5 Q/H met  4. ? Function: +8 40/80 50% impaired  MET  5. Patient to be independent with home exercise program as demonstrated by performance with correct form without cues. Met  6. Demonstrate Knowledge of fall prevention Met  7. Less c/o fatigue met  LTG: (to be met in 12  treatments)  1. R 120 @ knee  2. 5-/5 q/h strength   3.  Function +15       Patient goals: less and less fatigue and  strength  Pt. Education:  [x] Yes  [] No  [] Reviewed Prior HEP/Ed  Method of Education: [x] Verbal  [x] Demo  [] Written  Comprehension of Education:  [x] Verbalizes understanding. [x] Demonstrates understanding. [] Needs review. [] Demonstrates/verbalizes HEP/Ed previously given. Plan: [x] Continue current frequency toward long term goals.     [] Specific Instructions for subsequent treatments: Precautions:No weight machines per Dr Prakash Jorge     Time In:10:32        Time Out:11:35  Electronically signed by:  Maycol Aragon, PT

## 2021-01-08 ENCOUNTER — HOSPITAL ENCOUNTER (OUTPATIENT)
Dept: PHYSICAL THERAPY | Facility: CLINIC | Age: 81
Setting detail: THERAPIES SERIES
Discharge: HOME | End: 2021-01-08
Payer: MEDICARE

## 2021-01-08 PROCEDURE — 97164 PT RE-EVAL EST PLAN CARE: CPT

## 2021-01-08 PROCEDURE — 97124 MASSAGE THERAPY: CPT

## 2021-01-08 PROCEDURE — 97110 THERAPEUTIC EXERCISES: CPT

## 2021-01-08 NOTE — FLOWSHEET NOTE
[] [address]  [address]  [phone]  F: [phone] [] [address]  [facility]   [address]  P: [phone]  F: [phone] [x] [place]  [address] [facility]  P: [phone]  F: [phone] [] [address]  [facility]   [address]   [place]: [phone]  F: [phone]      Physical Therapy Daily Treatment Note    Date:  2021  Patient Name:  Richard Arora    :  [birth year]  MRN: [id number]  PPhysician: Anthony Lugo                              Insurance:Medicare   Medical Diagnosis: R TKA revision                Rehab Codes: M25.561  Onset Date:                                   Next 's appt: None scheduled    Visit# / total visits 11/; Progress note for Medicare patient due at visit 10  Cancels/No Shows: 0    Pt had put PT on hold for a while due to higher rate of Covid in [facility] area. Pt wanted   come in on [address] PM as minimal patients in clinic and but unable schedule last 2 weeks due to holidays     Subjective:    Pain:  [x] Yes  [] No Location:  R knee Pain Rating: (0-10 scale) 1/10  Pain altered Tx:  [] No  [x] Yes  Action:careful w/ L hip   Comments Pt   Objective:  Pt 's cc stiffness and swelling. Few days ago pt worked all day to putting [employer] away and R knee painful & swollen  Re eval / knee mild to moderate swelling   Pain-mild pain In R knee. Pt concerned about L hip, comes  and goes her pain     Strength quad 5 -, hamstrings 5-  Quad contraction-good and good patella mobility   Gait- ambulate w/o cane.    ROM 0-120 supine   Modalities: massage to R knee and distal quad not today  Precautions:No weight machines per Dr Waylon Chapman   Exercises:  Exercise Reps/ Time Weight/ Level Comments today   Nu step  10 L5   // bars             Retro walking  L3   no wts today      lateral walk w/ mini   squats 3    \" x     Hip abd   15    \"      Hip ext  15        Hip flexion   15  marches      Knee flexion   20 4        Heel raises   20 4        calf stretch   3 x 2   Gastro & soleus x     min squats 15  W/ lateral walk       mini lounge 15  @ 11, 12, 1 added 11/6 x     Step downs 15 6       step ups  lateral 15 6       step ups 15  6       Terminal knee    extension 15 blue       Single leg stance B 10 ea Green foam  W/deep breathing,  Chest open  Added foam 11/6  x      quad stretch sideline 5 x30   x      sh ext to neutral 10 orange        3 way hip B  10 Lime  No adduction  Added 10/28      heel to toe 2'  Added 11/6 x     Heel walking   \"   \" x            mat             hamstring stretch w/      rope   3   Easy  x                  SAQ  15 4#       hip add 15 pillow     Hip abd 15 blue T 75%    Hip abd 10  sideline x     10       LAQ  20 4#       bridges 10       Calm shells 10   x    Bridge w/ clam shell  5 blue  x          Alt UE & LE 3'  Added 11/6             Other:    Treatment Charges: Mins Units   []  Modalities     [x]  Ther Exercise 25 2   []  Manual Therapy     []  Ther Activities     []  Re eval 15 1   []  Vasocompression     []  Other massage  15 1   Total Treatment time  4     Assessment: [x] Progressing toward goals. Re eval completed today. Quad & Hamstring strength improved. Quad contraction and patella mobility good. [] No change. [x] Other:.Pt has mild to moderate swelling and cc of stiffness. Resume massage today for stiffness and swelling Pt will continue to work on HEP for another 2 -3 weeks.  Pt to receive vaccine week of Jan 25 and afterwards would like 1 more session to advance her HEP.  l[x] Patient would continue to benefit from skilled physical therapy services in order to: acheive LT goals    STG: (to be met in 6 treatments)  1. ? Pain: < 3 w/ ADL @ end of Day MET   2. ? ROM: 115 Met

## 2021-05-29 ENCOUNTER — HOSPITAL ENCOUNTER (EMERGENCY)
Age: 81
Discharge: HOME | End: 2021-05-29
Attending: EMERGENCY MEDICINE
Payer: MEDICARE

## 2021-05-29 VITALS
TEMPERATURE: 97.6 F | RESPIRATION RATE: 14 BRPM | SYSTOLIC BLOOD PRESSURE: 149 MMHG | HEIGHT: 64 IN | OXYGEN SATURATION: 97 % | HEART RATE: 77 BPM | BODY MASS INDEX: 29.88 KG/M2 | DIASTOLIC BLOOD PRESSURE: 72 MMHG | WEIGHT: 175 LBS

## 2021-05-29 DIAGNOSIS — B02.9 HERPES ZOSTER WITHOUT COMPLICATION: Primary | ICD-10-CM

## 2021-05-29 PROCEDURE — 6370000000 HC RX 637 (ALT 250 FOR IP): Performed by: PHYSICIAN ASSISTANT

## 2021-05-29 PROCEDURE — 99283 EMERGENCY DEPT VISIT LOW MDM: CPT

## 2021-05-29 RX ORDER — ACYCLOVIR 800 MG/1
800 TABLET ORAL
Qty: 50 TABLET | Refills: 0 | Status: SHIPPED | OUTPATIENT
Start: 2021-05-29 | End: 2021-06-08

## 2021-05-29 RX ORDER — SIMVASTATIN 40 MG
40 TABLET ORAL NIGHTLY
COMMUNITY

## 2021-05-29 RX ORDER — ESTRADIOL 1 MG/1
1 TABLET ORAL DAILY
COMMUNITY

## 2021-05-29 RX ORDER — TRIAMTERENE AND HYDROCHLOROTHIAZIDE 37.5; 25 MG/1; MG/1
1 TABLET ORAL DAILY
COMMUNITY

## 2021-05-29 RX ORDER — ACYCLOVIR 200 MG/1
800 CAPSULE ORAL ONCE
Status: COMPLETED | OUTPATIENT
Start: 2021-05-29 | End: 2021-05-29

## 2021-05-29 RX ORDER — ATENOLOL 50 MG/1
50 TABLET ORAL DAILY
COMMUNITY

## 2021-05-29 RX ADMIN — ACYCLOVIR 800 MG: 200 CAPSULE ORAL at 20:37

## 2021-05-30 NOTE — ED PROVIDER NOTES
[id number] [facility] ED  [address] [facility] [address]  Phone: [phone]  Fax: [phone]      eMERGENCY dEPARTMENT eNCOUnter      Pt Name: Bambi Flores  MRN: [id number]  [place] 1940  Date of evaluation: 21      CHIEF COMPLAINT:  Chief Complaint   Patient presents with    Rash     left side back- Hx of shingles. Onset Wednesday       HISTORY OF PRESENT ILLNESS    Bambi Flores is a [de-identified] y.o. female who presents with rash complaint:    Location/Symptom:   Rash on chest wall  Timing/Onset:   3 days  Context/Setting:   Pt here with rash of left chestwall, started out with mild pain but increasing. History of Shingles ~ 20 yrs ago. No significant resp symptoms or chest pain. No abd/GI symptoms or pain. No other areas of rash on her body. Quality:   Burning, irritating  Duration:   constant  Modifying Factors:   none  Severity:   Mild-moderate    Nursing Notes were reviewed. REVIEW OF SYSTEMS       Constitutional: Denies recent fever, chills. Eyes: No eye pain. No vision changes. Neck: No neck pain. Respiratory: Denies recent shortness of breath. Cardiac:  Denies recent chest pain. GI:  Denies abdominal pain/nausea/vomiting/diarrhea. : Denies dysuria. Musculoskeletal: Denies focal weakness. Neurologic: denies headache or focal weakness. Skin:  Rash     Negative in 10 essential Systems except as mentioned above and in the HPI. PAST MEDICAL HISTORY   PMH:  has a past medical history of Hyperlipidemia, Hypertension, and Shingles. Surgical History:  has a past surgical history that includes shoulder surgery; hip surgery; Cholecystectomy; Appendectomy;  section; and Hysterectomy. Social History:  reports that she has never smoked. She does not have any smokeless tobacco history on file. She reports current alcohol use. She reports that she does not use drugs.   Family History: None  Psychiatric History: None    Allergies:is allergic to pcn [penicillins]. PHYSICAL EXAM     INITIAL VITALS: BP (!) 149/72   Pulse 77   Temp 97.6 °F (36.4 °C) (Oral)   Resp 14   Ht 5' 4\" (1.626 m)   Wt 79.4 kg (175 lb)   SpO2 97%   BMI 30.04 kg/m²   Constitutional:  Well developed   Eyes:  Pupils equal/round  HENT:  Atraumatic, external ears normal, nose normal.    Respiratory:  Clear to auscultation bilaterally with good air exchange, no W/R/R  Cardiovascular:  RRR with normal S1 and S2  Gastrointestinal/Abdomen:  Soft, NT. Per Integ. Musculoskeletal:   Normal to inspection  Back:  No CVA tenderness. Per Integ. Integument:  Grouped confluent papular rash of left posterior/lateral/anterior lower rib/chestwall and extension to her abdomen. No overt vesicles appreciated. No discharge/induration or fluctuance. Neurologic:  Alert, appropriate mentation/interaction, no focal deficits noted       DIAGNOSTIC RESULTS     EKG: All EKG's are interpreted by the Emergency Department Physician who either signs or Co-signs this chart in the absence of a cardiologist.  Not indicated    RADIOLOGY:   Reviewed the radiologist:  No orders to display     Not indicated      LABS:  Labs Reviewed - No data to display  Not indicated    EMERGENCY DEPARTMENT COURSE:     2019  Suspicious for Shingles, will treat. First dose here. PCP f/u recommended. Pt declined pain meds. I have reviewed the disposition diagnosis with the patient and or their family/guardian. I have answered their questions and given discharge instructions. They voiced understanding of these instructions and did not have any further questions or complaints.       Orders Placed This Encounter   Medications    acyclovir (ZOVIRAX) capsule 800 mg     Order Specific Question:   Antimicrobial Indications     Answer:   Skin and Soft Tissue Infection    acyclovir (ZOVIRAX) 800 MG tablet     Sig: Take 1 tablet by mouth 5 times daily for 10 days     Dispense:  50 tablet     Refill:  0 CONSULTS:  None      FINAL IMPRESSION      1. Herpes zoster without complication          DISPOSITION/PLAN:  DISPOSITION Decision To Discharge 05/29/2021 08:23:09 PM        PATIENT REFERRED TO:  Sangeeta Garcia  [facility] [address]    Schedule an appointment as soon as possible for a visit in 3 days  for re-evaluation of your symptoms    [facility]  [address] [facility]  [phone]  Go to   for re-evaluation of your symptoms      DISCHARGE MEDICATIONS:  New Prescriptions    ACYCLOVIR (ZOVIRAX) 800 MG TABLET    Take 1 tablet by mouth 5 times daily for 10 days       (Please note that portions of this note were completed with a voice recognition program.  Efforts were made to edit the dictations but occasionally words are mis-transcribed.)    UMESH Chaves PA-C  05/29/21 2025

## 2021-05-30 NOTE — ED PROVIDER NOTES
[id number] [facility] the [facility] ED  [address] [place] [address]  Phone: [phone]  Fax: [phone]      Attending Physician Attestation    I performed a history and physical examination of the patient and discussed management with the mid level provideer. I reviewed the mid level provider's note and agree with the documented findings and plan of care. Any areas of disagreement are noted on the chart. I was personally present for the key portions of any procedures. I have documented in the chart those procedures where I was not present during the key portions. I have reviewed the emergency nurses triage note. I agree with the chief complaint, past medical history, past surgical history, allergies, medications, social and family history as documented unless otherwise noted below. Documentation of the HPI, Physical Exam and Medical Decision Making performed by mid level providers is based on my personal performance of the HPI, PE and MDM. For Physician Assistant/ Nurse Practitioner cases/documentation I have personally evaluated this patient and have completed at least one if not all key elements of the E/M (history, physical exam, and MDM). Additional findings are as noted. CHIEF COMPLAINT       Chief Complaint   Patient presents with    Rash     left side back- Hx of shingles. Onset Wednesday         PAST MEDICAL HISTORY    has a past medical history of Hyperlipidemia, Hypertension, and Shingles. SURGICAL HISTORY      has a past surgical history that includes shoulder surgery; hip surgery; Cholecystectomy; Appendectomy;  section; and Hysterectomy.     CURRENT MEDICATIONS       Previous Medications    ATENOLOL (TENORMIN) 50 MG TABLET    Take 50 mg by mouth daily Unsure as to dosage    ESTRADIOL (ESTRACE) 1 MG TABLET    Take 1 mg by mouth daily Unsure as to dosage    SIMVASTATIN (ZOCOR) 40 MG TABLET    Take 40 mg by mouth nightly Unsure as to dosage    TRIAMTERENE-HYDROCHLOROTHIAZIDE (MAXZIDE-25) 37.5-25 MG PER TABLET    Take 1 tablet by mouth daily       ALLERGIES     is allergic to pcn [penicillins]. FAMILY HISTORY     has no family status information on file. family history is not on file. SOCIAL HISTORY      reports that she has never smoked. She does not have any smokeless tobacco history on file. She reports current alcohol use. She reports that she does not use drugs. DIAGNOSTIC RESULTS     EKG: All EKG's are interpreted by the Emergency Department Physician who either signs or Co-signs this chart in the absence of a cardiologist.      RADIOLOGY:   Non-plain film images such as CT, Ultrasound and MRI are read by the radiologist. Plain radiographic images are visualized and the radiologist interpretations are reviewed as follows: No orders to display       No results found. LABS:  No results found for this visit on 05/29/21. EMERGENCY DEPARTMENT COURSE:   Vitals:    Vitals:    05/29/21 1953 05/29/21 1959   BP: (!) 149/72    Pulse: 77    Resp: 14    Temp:  97.6 °F (36.4 °C)   TempSrc: Oral Oral   SpO2: 97%    Weight: 79.4 kg (175 lb)    Height: 5' 4\" (1.626 m)      -------------------------  BP: (!) 149/72, Temp: 97.6 °F (36.4 °C), Pulse: 77, Resp: 14      PERTINENT ATTENDING PHYSICIAN COMMENTS:    Patient presents with a dermatomal shingles type rash. Clinically it appears to be shingles. It is somewhat painful. Patient is declining analgesics. States she has had shingles before and feels similar. It is on her left lateral side in the abdomen region. Vesicles appreciated. Nontender. Does not appear otherwise infectious. We will put her on an antiviral and have her follow-up with her PCP. She is comfortable with the plan. Clinically she appears very well and otherwise nontoxic or septic.     The patient understands that at this time there is no evidence for a more malignant underlying process, but the patient also understands that early in the process of an illness or injury, an emergency department workup can be falsely reassuring. Routine discharge counseling was given, and the patient understands that worsening, changing or persistent symptoms should prompt an immediate call or follow up with their primary physician or return to the emergency department. The importance of appropriate follow up was also discussed. I have reviewed the disposition diagnosis with the patient and or their family/guardian. I have answered their questions and given discharge instructions. They voiced understanding of these instructions and did not have any further questions or complaints. CONSULTS:    None    CRITICAL CARE:     None    PROCEDURES:    None    FINAL IMPRESSION      1. Herpes zoster without complication          DISPOSITION/PLAN   DISPOSITION Decision To Discharge 05/29/2021 08:23:09 PM      Condition on Disposition    Improved    PATIENT REFERRED TO:  David Elizabeth  [place] [address]    Schedule an appointment as soon as possible for a visit in 3 days  for re-evaluation of your symptoms    [facility] ED  [address] [place] [address]  [phone]  Go to   for re-evaluation of your symptoms      DISCHARGE MEDICATIONS:  New Prescriptions    ACYCLOVIR (ZOVIRAX) 800 MG TABLET    Take 1 tablet by mouth 5 times daily for 10 days       (Please note that portions of this note were completed with a voice recognition program.  Efforts were made to edit the dictations but occasionally words are mis-transcribed.)    Farnaz De León DO, DO  Attending Emergency Physician       Farnaz De León DO  05/29/21 2031

## 2024-04-29 ENCOUNTER — RX ONLY (RX ONLY)
Age: 84
End: 2024-04-29

## 2024-04-29 ENCOUNTER — APPOINTMENT (OUTPATIENT)
Dept: URBAN - METROPOLITAN AREA SURGERY 9 | Age: 84
Setting detail: DERMATOLOGY
End: 2024-04-29

## 2024-04-29 DIAGNOSIS — Z41.9 ENCOUNTER FOR PROCEDURE FOR PURPOSES OTHER THAN REMEDYING HEALTH STATE, UNSPECIFIED: ICD-10-CM

## 2024-04-29 DIAGNOSIS — I78.1 NEVUS, NON-NEOPLASTIC: ICD-10-CM

## 2024-04-29 DIAGNOSIS — L82.1 OTHER SEBORRHEIC KERATOSIS: ICD-10-CM

## 2024-04-29 DIAGNOSIS — L57.8 OTHER SKIN CHANGES DUE TO CHRONIC EXPOSURE TO NONIONIZING RADIATION: ICD-10-CM

## 2024-04-29 PROCEDURE — OTHER COSMETIC CONSULTATION - RED SPOTS: OTHER

## 2024-04-29 PROCEDURE — OTHER TREATMENT REGIMEN: OTHER

## 2024-04-29 PROCEDURE — OTHER COUNSELING: OTHER

## 2024-04-29 PROCEDURE — OTHER COSMETIC CONSULTATION: LASER RESURFACING: OTHER

## 2024-04-29 PROCEDURE — OTHER OTHER: OTHER

## 2024-04-29 RX ORDER — VALACYCLOVIR HYDROCHLORIDE 500 MG/1
TABLET, FILM COATED ORAL
Qty: 10 | Refills: 0 | Status: ERX | COMMUNITY
Start: 2024-04-29

## 2024-04-29 ASSESSMENT — LOCATION DETAILED DESCRIPTION DERM
LOCATION DETAILED: RIGHT SUPERIOR CENTRAL MALAR CHEEK
LOCATION DETAILED: NASAL DORSUM
LOCATION DETAILED: RIGHT MEDIAL ZYGOMA
LOCATION DETAILED: INFERIOR MID FOREHEAD
LOCATION DETAILED: RIGHT MEDIAL FOREHEAD

## 2024-04-29 ASSESSMENT — LOCATION SIMPLE DESCRIPTION DERM
LOCATION SIMPLE: INFERIOR FOREHEAD
LOCATION SIMPLE: NOSE
LOCATION SIMPLE: RIGHT CHEEK
LOCATION SIMPLE: RIGHT ZYGOMA
LOCATION SIMPLE: RIGHT FOREHEAD

## 2024-04-29 ASSESSMENT — LOCATION ZONE DERM
LOCATION ZONE: FACE
LOCATION ZONE: NOSE

## 2024-04-29 NOTE — PROCEDURE: TREATMENT REGIMEN
Initiate Treatment: Recommended Melatonik nightly (pt unable to tolerate retinols/tretinoin - states ICD/ACD to product) and\\nVitamin C daily (pt uses an OTC brand)
Detail Level: Simple

## 2024-04-29 NOTE — PROCEDURE: OTHER
Note Text (......Xxx Chief Complaint.): This diagnosis correlates with the
Render Risk Assessment In Note?: no
Other (Free Text): Quoted $300/tx. Anticipated 2 treatments.  The cost includes LN2 tx to two SKs right zygoma/cheek
Detail Level: Simple
Other (Free Text): Discussed freezing with LN2 at laser apt. Cost included with XLV laser treatment
Detail Level: Detailed
Other (Free Text): Pt has significant rhytides perioral and medial cheeks region.  Dyschromia on cheeks and forehead.  $1750 full face tx (will help soften lines, but not fully eradicate).
Detail Level: Zone

## 2024-05-06 ENCOUNTER — APPOINTMENT (OUTPATIENT)
Dept: URBAN - METROPOLITAN AREA SURGERY 9 | Age: 84
Setting detail: DERMATOLOGY
End: 2024-05-06

## 2024-05-06 DIAGNOSIS — Z41.9 ENCOUNTER FOR PROCEDURE FOR PURPOSES OTHER THAN REMEDYING HEALTH STATE, UNSPECIFIED: ICD-10-CM

## 2024-05-06 PROCEDURE — OTHER LASER RESURFACING: OTHER

## 2024-05-06 PROCEDURE — OTHER OTHER (COSMETIC): OTHER

## 2024-05-06 ASSESSMENT — LOCATION DETAILED DESCRIPTION DERM: LOCATION DETAILED: LEFT INFERIOR CENTRAL MALAR CHEEK

## 2024-05-06 ASSESSMENT — LOCATION ZONE DERM: LOCATION ZONE: FACE

## 2024-05-06 ASSESSMENT — LOCATION SIMPLE DESCRIPTION DERM: LOCATION SIMPLE: LEFT CHEEK

## 2024-05-06 NOTE — PROCEDURE: LASER RESURFACING
Laser Type: CO2 laser
Length Of Topical Anesthesia Application (Optional): 45 minutes
Anesthesia Type: 1% lidocaine with epinephrine
Percent Coverage Per Pass (%): 0
External Cooling Fan Speed: 5
Number Of Passes: 1
Consent: Written consent obtained, risks reviewed including but not limited to crusting, scabbing, blistering, scarring, darker or lighter pigmentary change, incomplete improvement of dyschromia, wrinkles, prolonged erythema and facial swelling, infection and bleeding.
Wavelength: 10,600nm
Topical Anesthesia?: 23% lidocaine, 7% tetracaine
Detail Level: Zone
Corneal Shield Text: A corneal shield was inserted after appropriate application of topical anesthesia.
Post-Care Instructions: I reviewed with the patient in detail post-care instructions. Patient should avoid sun until area fully healed. Pt should apply vaseline to treated areas, and remove crusts gently with water-vinegar soaks.
Power (Wynne): 25
Was A Corneal Shield Used?: No

## 2024-05-06 NOTE — PROCEDURE: OTHER (COSMETIC)
Other (Free Text): Settings:\\nfull face -- dwell time: 1500usec; spacinum (Double stack to full face except nose, which was single pass)\\nPeriorbital -- dwell time: 1000usec; spacinum (single pass))\\n\\nTx #1: Cosmetic fee of $1750 for CO2 laser full face \\n\\nLidocaine/tetracaine 23%/7%\\nLot#:[id number]\\nDiscard after: 10/14/2024
Detail Level: Zone
Other (Free Text): Reviewed patient's med/allergy list.  \\nPatient's BP: 138/78\\nPatient does have a .\\nPer Dr. Gunter's verbal order, dispensed 5mg Diazepam PO to patient at 8:15am.\\nLOT #:  [id number]         \\nExpiration: Dec 2024\\n\\nPost laser f/u 1-2 wks after treatment.

## 2024-05-21 ENCOUNTER — APPOINTMENT (OUTPATIENT)
Dept: URBAN - METROPOLITAN AREA SURGERY 9 | Age: 84
Setting detail: DERMATOLOGY
End: 2024-05-21

## 2024-05-21 DIAGNOSIS — Z41.9 ENCOUNTER FOR PROCEDURE FOR PURPOSES OTHER THAN REMEDYING HEALTH STATE, UNSPECIFIED: ICD-10-CM

## 2024-05-21 DIAGNOSIS — L29.8 OTHER PRURITUS: ICD-10-CM

## 2024-05-21 PROCEDURE — OTHER COSMETIC FOLLOW-UP: OTHER

## 2024-05-21 PROCEDURE — OTHER PRESCRIPTION: OTHER

## 2024-05-21 PROCEDURE — OTHER COUNSELING: OTHER

## 2024-05-21 PROCEDURE — OTHER PRESCRIPTION MEDICATION MANAGEMENT: OTHER

## 2024-05-21 PROCEDURE — OTHER OTHER: OTHER

## 2024-05-21 RX ORDER — HYDROCORTISONE 25 MG/G
CREAM TOPICAL
Qty: 30 | Refills: 0 | Status: ERX | COMMUNITY
Start: 2024-05-21

## 2024-05-21 ASSESSMENT — LOCATION SIMPLE DESCRIPTION DERM: LOCATION SIMPLE: LEFT FOREHEAD

## 2024-05-21 ASSESSMENT — LOCATION ZONE DERM: LOCATION ZONE: FACE

## 2024-05-21 ASSESSMENT — LOCATION DETAILED DESCRIPTION DERM: LOCATION DETAILED: LEFT MEDIAL FOREHEAD

## 2024-05-21 NOTE — PROCEDURE: PRESCRIPTION MEDICATION MANAGEMENT
Render In Strict Bullet Format?: Yes
Detail Level: Zone
Initiate Treatment: Hydrocortisone 2.5% cream BID advised to use sparingly.

## 2024-05-21 NOTE — PROCEDURE: OTHER
Other (Free Text): Pt instructed it’s okay to reintroduce her own skin care regimen. Advised her to always apply SPF while in the sun. Recommended SBS tonesmart SPF 75. 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 Samples of dermeleave given to help with itching.
Detail Level: Zone
Render Risk Assessment In Note?: no
Note Text (......Xxx Chief Complaint.): This diagnosis correlates with the
[address]

## 2024-06-24 ENCOUNTER — APPOINTMENT (OUTPATIENT)
Dept: URBAN - METROPOLITAN AREA SURGERY 9 | Age: 84
Setting detail: DERMATOLOGY
End: 2024-06-24

## 2024-06-24 DIAGNOSIS — L82.1 OTHER SEBORRHEIC KERATOSIS: ICD-10-CM

## 2024-06-24 DIAGNOSIS — I78.1 NEVUS, NON-NEOPLASTIC: ICD-10-CM

## 2024-06-24 PROCEDURE — OTHER LIQUID NITROGEN (COSMETIC): OTHER

## 2024-06-24 PROCEDURE — OTHER OTHER (COSMETIC): OTHER

## 2024-06-24 PROCEDURE — OTHER EXCEL V LASER: OTHER

## 2024-06-24 PROCEDURE — OTHER OTHER: OTHER

## 2024-06-24 ASSESSMENT — LOCATION ZONE DERM
LOCATION ZONE: FACE
LOCATION ZONE: NOSE

## 2024-06-24 ASSESSMENT — LOCATION DETAILED DESCRIPTION DERM
LOCATION DETAILED: RIGHT CENTRAL ZYGOMA
LOCATION DETAILED: NASAL DORSUM

## 2024-06-24 ASSESSMENT — LOCATION SIMPLE DESCRIPTION DERM
LOCATION SIMPLE: RIGHT ZYGOMA
LOCATION SIMPLE: NOSE

## 2024-06-24 NOTE — PROCEDURE: EXCEL V LASER
Laser Type: KTP 532nm
Pre-Procedure Text: After consent was obtained and the procedure was explained, all persons present in the exam room put on their protective eyewear. Cold gel was applied to the treatment areas.
Consent: Written consent obtained, risks reviewed including but not limited to crusting, scabbing, blistering, scarring, darker or lighter pigmentary change, and/or incomplete removal.
Fluence In J: 14
Post-Procedure Text: Following the treatment, ice and broad spectrum sunscreen was applied to the treatment areas.  Post-care instructions were discussed.
External Cooling Fan Speed: 5
Detail Level: Simple
Repetition Rate: 1.0 Hz
Spot Size: 3
Temperature: 5 C
Treatment Number (Will Not Render If 0): 1
Pulse Width In Msec: 9
Procedural Text: The treatment areas where then treated as noted above.
Post-Care Instructions: I reviewed with the patient in detail post-care instructions. Patient is to apply vaseline with a q-tip to all crusted areas, and avoid picking at any scabs. Pt should stay away from the sun and wear sun protection until fully healed.

## 2024-06-24 NOTE — PROCEDURE: OTHER (COSMETIC)
Detail Level: Simple
Other (Free Text): Tx #1. Cosmetic fee of $300.\\n\\nfor 2nd tx - may need to consider Nd:Yag for deeper angioma component (if it is needed).

## 2024-06-24 NOTE — PROCEDURE: LIQUID NITROGEN (COSMETIC)
Total Number Of Lesions Treated: 2
Consent: The patient's consent was obtained including but not limited to risks of crusting, scabbing, blistering, scarring, darker or lighter pigmentary change, recurrence, incomplete removal and infection.
Duration Of Freeze Thaw-Cycle (Seconds): 0
Detail Level: Simple
Render Post Care In The Note?: yes
Post-Care Instructions: I reviewed with the patient in detail post-care instructions. Patient is to wear sunprotection, and avoid picking at any of the treated lesions. Pt may apply Vaseline to crusted or scabbing areas.

## 2024-06-24 NOTE — PROCEDURE: OTHER
Other (Free Text): Cost of treatment included in laser cost.
Note Text (......Xxx Chief Complaint.): This diagnosis correlates with the
Detail Level: Zone
Render Risk Assessment In Note?: no

## 2024-06-24 NOTE — HPI: COSMETIC (LASER RED SPOTS)
Additional History: Treatment #1. \\n\\nAlso supposed to have 2 SKs removed today with LN2 on L cheek.

## 2024-07-22 ENCOUNTER — APPOINTMENT (OUTPATIENT)
Dept: URBAN - METROPOLITAN AREA SURGERY 9 | Age: 84
Setting detail: DERMATOLOGY
End: 2024-07-22

## 2024-07-22 DIAGNOSIS — I78.1 NEVUS, NON-NEOPLASTIC: ICD-10-CM

## 2024-07-22 PROCEDURE — OTHER EXCEL V LASER: OTHER

## 2024-07-22 PROCEDURE — OTHER OTHER (COSMETIC): OTHER

## 2024-07-22 ASSESSMENT — LOCATION DETAILED DESCRIPTION DERM: LOCATION DETAILED: NASAL DORSUM

## 2024-07-22 ASSESSMENT — LOCATION ZONE DERM: LOCATION ZONE: NOSE

## 2024-07-22 ASSESSMENT — LOCATION SIMPLE DESCRIPTION DERM: LOCATION SIMPLE: NOSE

## 2024-07-22 NOTE — PROCEDURE: OTHER (COSMETIC)
Detail Level: Simple
Other (Free Text): Tx #2. Cosmetic fee of $250.\\nAlso treated with 1064nm, 5mm, 110J, 35 ms\\n\\nWith 5mm spot size, hit the lesion off the skin through a dollop of gel.

## 2024-07-22 NOTE — PROCEDURE: EXCEL V LASER
Laser Type: KTP 532nm
Pre-Procedure Text: After consent was obtained and the procedure was explained, all persons present in the exam room put on their protective eyewear. Cold gel was applied to the treatment areas.
Consent: Written consent obtained, risks reviewed including but not limited to crusting, scabbing, blistering, scarring, darker or lighter pigmentary change, and/or incomplete removal.
Fluence In J: 9
Post-Procedure Text: Following the treatment, ice and broad spectrum sunscreen was applied to the treatment areas.  Post-care instructions were discussed.
External Cooling Fan Speed: 5
Detail Level: Simple
Repetition Rate: 1.0 Hz
Temperature: 5 C
Treatment Number (Will Not Render If 0): 2
Pulse Width In Msec: 10
Procedural Text: The treatment areas where then treated as noted above.
Post-Care Instructions: I reviewed with the patient in detail post-care instructions. Patient is to apply vaseline with a q-tip to all crusted areas, and avoid picking at any scabs. Pt should stay away from the sun and wear sun protection until fully healed.